# Patient Record
Sex: FEMALE | Employment: OTHER | URBAN - METROPOLITAN AREA
[De-identification: names, ages, dates, MRNs, and addresses within clinical notes are randomized per-mention and may not be internally consistent; named-entity substitution may affect disease eponyms.]

---

## 2018-01-12 ENCOUNTER — LAB REQUISITION (OUTPATIENT)
Dept: LAB | Facility: HOSPITAL | Age: 63
End: 2018-01-12

## 2018-01-12 DIAGNOSIS — Z00.00 ROUTINE GENERAL MEDICAL EXAMINATION AT A HEALTH CARE FACILITY: ICD-10-CM

## 2018-10-10 ENCOUNTER — LAB REQUISITION (OUTPATIENT)
Dept: LAB | Facility: HOSPITAL | Age: 63
End: 2018-10-10

## 2018-10-10 DIAGNOSIS — Z00.00 ROUTINE GENERAL MEDICAL EXAMINATION AT A HEALTH CARE FACILITY: ICD-10-CM

## 2018-10-10 PROCEDURE — 36415 COLL VENOUS BLD VENIPUNCTURE: CPT

## 2019-10-02 ENCOUNTER — LAB REQUISITION (OUTPATIENT)
Dept: LAB | Facility: HOSPITAL | Age: 64
End: 2019-10-02

## 2019-10-02 DIAGNOSIS — Z00.00 ROUTINE GENERAL MEDICAL EXAMINATION AT A HEALTH CARE FACILITY: ICD-10-CM

## 2019-10-02 PROCEDURE — 36415 COLL VENOUS BLD VENIPUNCTURE: CPT

## 2023-04-12 ENCOUNTER — TRANSCRIBE ORDERS (OUTPATIENT)
Dept: PHYSICAL THERAPY | Facility: CLINIC | Age: 68
End: 2023-04-12
Payer: MEDICARE

## 2023-04-12 DIAGNOSIS — M54.50 CHRONIC LOW BACK PAIN WITHOUT SCIATICA, UNSPECIFIED BACK PAIN LATERALITY: Primary | ICD-10-CM

## 2023-04-12 DIAGNOSIS — G89.29 CHRONIC LOW BACK PAIN WITHOUT SCIATICA, UNSPECIFIED BACK PAIN LATERALITY: Primary | ICD-10-CM

## 2023-04-27 ENCOUNTER — TREATMENT (OUTPATIENT)
Dept: PHYSICAL THERAPY | Facility: CLINIC | Age: 68
End: 2023-04-27
Payer: MEDICARE

## 2023-04-27 DIAGNOSIS — G89.29 CHRONIC MIDLINE LOW BACK PAIN WITHOUT SCIATICA: Primary | ICD-10-CM

## 2023-04-27 DIAGNOSIS — M54.50 CHRONIC MIDLINE LOW BACK PAIN WITHOUT SCIATICA: Primary | ICD-10-CM

## 2023-04-27 DIAGNOSIS — R26.89 BALANCE PROBLEM: ICD-10-CM

## 2023-04-27 PROCEDURE — 97110 THERAPEUTIC EXERCISES: CPT | Performed by: PHYSICAL THERAPIST

## 2023-04-27 PROCEDURE — 97162 PT EVAL MOD COMPLEX 30 MIN: CPT | Performed by: PHYSICAL THERAPIST

## 2023-04-27 NOTE — PROGRESS NOTES
Physical Therapy Initial Evaluation and Plan of Care  [address]  [place] [address]    Patient: Vivian Burgess   : 1955  Diagnosis/ICD-10 Code:  No primary diagnosis found.  Referring practitioner: Lisa Whaley MD  Date of Initial Visit: 2023  Today's Date: 2023  Patient seen for Visit count could not be calculated. Make sure you are using a visit which is associated with an episode. session         Visit Diagnoses:  No diagnosis found.      Subjective Questionnaire: Oswestry: 20%      Subjective Evaluation    History of Present Illness  Mechanism of injury: Pt is a 68 year old female presenting to the clinic with low back pain and balance issues. She notices she has trouble with stairs. She feels like she has weakness and her equilibrium is off. She also has low back pain with yard work and walking long periods. Her back used to bother her all the time but now it only bothers her with heavy activities. She used to have some numbness in her extremities, but she has not had that in 6 months. She feels like she has pain after walking for 15 min. Standing for long periods of time makes her feel off balance. She also gets really nervous with walking up bleachers at her [email or web address] games. She takes meloxicam for her back as needed.       Patient Occupation: Retired- part time work at [employer] Quality of life: excellent    Pain  Current pain ratin  At worst pain ratin  Quality: sharp  Relieving factors: medications  Aggravating factors: standing, lifting and ambulation  Progression: worsening    Hand dominance: right    Diagnostic Tests  No diagnostic tests performed    Patient Goals  Patient goals for therapy: decreased pain, increased motion, improved balance, increased strength and independence with ADLs/IADLs             Objective          Active Range of Motion     Lumbar   Flexion: 60 degrees   Extension: 11 degrees   Left lateral flexion: 8 degrees    Right lateral flexion: 10 degrees     Strength/Myotome Testing     Left Hip   Planes of Motion   Flexion: 5  Abduction: 3+    Right Hip   Planes of Motion   Flexion: 5  Abduction: 3+    Left Knee   Flexion: 5  Extension: 5    Right Knee   Flexion: 5  Extension: 5    Left Ankle/Foot   Dorsiflexion: 5    Right Ankle/Foot   Dorsiflexion: 5    Tests     Lumbar     Left   Negative passive SLR.     Right   Negative passive SLR.     Ambulation     Quality of Movement During Gait     Pelvis    Pelvis (Left): Positive Trendelenburg.   Pelvis (Right): Positive Trendelenburg.     Functional Assessment     Single Leg Stance   Left: 2 seconds  Right: 6 seconds    Comments  Tandem balance R in front: 18 sec  Tandem balance L in front: 3 sec    FTSTS: 10 sec          Assessment & Plan     Assessment  Impairments: abnormal gait, abnormal or restricted ROM, activity intolerance, impaired balance, impaired physical strength, lacks appropriate home exercise program and pain with function  Functional Limitations: lifting, walking, uncomfortable because of pain, standing and unable to perform repetitive tasks  Assessment details: Pt is a 68 year old female presenting to the clinic with low back pain and balance issues. She demonstrates decreased lumbar AROM, decreased hip strength, poor balance, and abnormal gait pattern. Her impairments are limiting her ability to go up and down steps and walk for long periods. Pt would benefit from skilled PT services to address her impairments so she can reach her long term goals.  Prognosis: good    Goals  Plan Goals: SHORT TERM GOALS:     2 weeks  1. Pt independent with HEP  2. Pt to demonstrate tandem balance for 30s bilaterally to show improved stability  3. Pt to demonstrate bilateral hip strength 4/5 in all planes to improved stability of the core/trunk     LONG TERM GOALS:   6 weeks  1. Pt to demonstrate trunk AROM % of expected norms to allow for improved ability to perform functional  activities  2. Pt to demonstrate ability to perform full functional squat with good form and without increased pain in the low back   3. Pt to report being able to work full shift or work in the home without increase in pain in the back    Plan  Therapy options: will be seen for skilled therapy services  Planned modality interventions: cryotherapy, dry needling, electrical stimulation/Russian stimulation, high voltage pulsed current (pain management), TENS and thermotherapy (hydrocollator packs)  Planned therapy interventions: abdominal trunk stabilization, manual therapy, neuromuscular re-education, postural training, soft tissue mobilization, spinal/joint mobilization, stretching, strengthening, therapeutic activities, home exercise program, gait training, functional ROM exercises, body mechanics training and balance/weight-bearing training  Duration in visits: 1  Duration in weeks: 12  Treatment plan discussed with: patient        History # of Personal Factors and/or Comorbidities: MODERATE (1-2)  Examination of Body System(s): # of elements: MODERATE (3)  Clinical Presentation: STABLE   Clinical Decision Making: MODERATE      Timed:         Manual Therapy:         mins  82270;     Therapeutic Exercise:    15     mins  00038;     Neuromuscular Romero:        mins  65239;    Therapeutic Activity:          mins  68670;     Gait Training:           mins  92161;     Ultrasound:          mins  38484;    Ionto                                   mins   59740  Self Care                            mins   69794  Canalith Repos         mins 42219      Un-Timed:  Electrical Stimulation:         mins  70238 ( );  Dry Needling          mins self-pay  Traction          mins 99745  Low Eval          Mins  38362  Mod Eval     31     Mins  32844  High Eval                            Mins  00293        Timed Treatment:   15   mins   Total Treatment:     46   mins          PT: Jud Robledo, REJI   License Number:  [id number]  Electronically signed by Jud Robledo, PT, 04/27/23, 9:40 AM EDT    Certification Period: 4/27/2023 thru 7/25/2023  I certify that the therapy services are furnished while this patient is under my care.  The services outlined above are required by this patient, and will be reviewed every 90 days.         Physician Signature:__________________________________________________    PHYSICIAN: Lisa Whaley MD  NPI: [id number]                                      DATE:      Please sign and return via fax to [email or web address] . Thank you, [facility].

## 2023-05-03 ENCOUNTER — TREATMENT (OUTPATIENT)
Dept: PHYSICAL THERAPY | Facility: CLINIC | Age: 68
End: 2023-05-03
Payer: MEDICARE

## 2023-05-03 DIAGNOSIS — R26.89 BALANCE PROBLEM: ICD-10-CM

## 2023-05-03 DIAGNOSIS — M54.50 CHRONIC MIDLINE LOW BACK PAIN WITHOUT SCIATICA: Primary | ICD-10-CM

## 2023-05-03 DIAGNOSIS — G89.29 CHRONIC MIDLINE LOW BACK PAIN WITHOUT SCIATICA: Primary | ICD-10-CM

## 2023-05-03 PROCEDURE — 97112 NEUROMUSCULAR REEDUCATION: CPT | Performed by: PHYSICAL THERAPIST

## 2023-05-03 PROCEDURE — 97110 THERAPEUTIC EXERCISES: CPT | Performed by: PHYSICAL THERAPIST

## 2023-05-03 NOTE — PROGRESS NOTES
Physical Therapy Daily Progress Note    Subjective   Vivian Burgess reports: she is doing well and has been doing her HEP regularly.      Objective   See Exercise, Manual, and Modality Logs for complete treatment.       Assessment/Plan   Pt tolerated treatment well. She required cues to not allow trunk lean with side steps in order to better activate the gluts. Pt would benefit from continued skilled PT.    Progress per Plan of Care           Manual Therapy:         mins  33405;  Therapeutic Exercise:    12     mins  79853;     Neuromuscular Romero:    15    mins  13783;    Therapeutic Activity:          mins  40185;     Gait Training:           mins  54250;     Ultrasound:          mins  97665;    Electrical Stimulation:         mins  46359 ( );  E-Stim Attended:         mins  64207  Iontophoresis          mins 76842   Traction          mins  26114  Fluidotherapy          mins  04848  Dry Needling         mins self-pay - No Charge  Paraffin          mins  70596    Timed Treatment:   27   mins   Total Treatment:     53   mins    Jud Robledo, PT, DPT  Physical Therapist

## 2023-05-11 ENCOUNTER — TREATMENT (OUTPATIENT)
Dept: PHYSICAL THERAPY | Facility: CLINIC | Age: 68
End: 2023-05-11
Payer: MEDICARE

## 2023-05-11 DIAGNOSIS — R26.89 BALANCE PROBLEM: ICD-10-CM

## 2023-05-11 DIAGNOSIS — M54.50 CHRONIC MIDLINE LOW BACK PAIN WITHOUT SCIATICA: Primary | ICD-10-CM

## 2023-05-11 DIAGNOSIS — G89.29 CHRONIC MIDLINE LOW BACK PAIN WITHOUT SCIATICA: Primary | ICD-10-CM

## 2023-05-11 PROCEDURE — 97112 NEUROMUSCULAR REEDUCATION: CPT | Performed by: PHYSICAL THERAPIST

## 2023-05-11 PROCEDURE — 97110 THERAPEUTIC EXERCISES: CPT | Performed by: PHYSICAL THERAPIST

## 2023-05-11 NOTE — PROGRESS NOTES
Physical Therapy Daily Progress Note    Subjective   Vivian Burgess reports: she is doing well. She was a little sore after her last visit.      Objective   See Exercise, Manual, and Modality Logs for complete treatment.       Assessment/Plan   Pt tolerated treatment well. She was able to complete her exercises with min cues for form. She required support for the step up balance exercise. Pt would benefit from continued skilled PT.     Progress per Plan of Care           Manual Therapy:         mins  00177;  Therapeutic Exercise:    15     mins  75502;     Neuromuscular Romero:    10    mins  62805;    Therapeutic Activity:          mins  69976;     Gait Training:           mins  82071;     Ultrasound:          mins  41245;    Electrical Stimulation:         mins  11411 ( );  E-Stim Attended:         mins  22936  Iontophoresis          mins 41118   Traction          mins  97491  Fluidotherapy          mins  20476  Dry Needling          mins self-pay - No Charge  Paraffin          mins  54099    Timed Treatment:   25   mins   Total Treatment:     48   mins    Jud Robledo, PT, DPT  Physical Therapist

## 2023-05-18 ENCOUNTER — TREATMENT (OUTPATIENT)
Dept: PHYSICAL THERAPY | Facility: CLINIC | Age: 68
End: 2023-05-18
Payer: MEDICARE

## 2023-05-18 DIAGNOSIS — R26.89 BALANCE PROBLEM: ICD-10-CM

## 2023-05-18 DIAGNOSIS — G89.29 CHRONIC MIDLINE LOW BACK PAIN WITHOUT SCIATICA: Primary | ICD-10-CM

## 2023-05-18 DIAGNOSIS — M54.50 CHRONIC MIDLINE LOW BACK PAIN WITHOUT SCIATICA: Primary | ICD-10-CM

## 2023-05-18 PROCEDURE — 97112 NEUROMUSCULAR REEDUCATION: CPT | Performed by: PHYSICAL THERAPIST

## 2023-05-18 PROCEDURE — 97110 THERAPEUTIC EXERCISES: CPT | Performed by: PHYSICAL THERAPIST

## 2023-05-18 NOTE — PROGRESS NOTES
Physical Therapy Daily Progress Note    Subjective   Vivian Burgess reports: she is frustrated because she was trying to do her exercises yesterday and she felt really off balance.      Objective          Functional Assessment     Single Leg Stance   Left: 5 seconds  Right: 12 seconds    Comments  Tandem balance bilateral: 30 sec      See Exercise, Manual, and Modality Logs for complete treatment.       Assessment/Plan   Pt tolerated treatment well. Her balance has improved since starting PT. She has the most difficulty with dynamic balance activities. Pt would benefit from continued skilled PT.     Progress per Plan of Care           Manual Therapy:         mins  46511;  Therapeutic Exercise:    15     mins  92576;     Neuromuscular Romero:    10    mins  96270;    Therapeutic Activity:          mins  26259;     Gait Training:           mins  42851;     Ultrasound:          mins  41322;    Electrical Stimulation:         mins  35330 ( );  E-Stim Attended:         mins  90831  Iontophoresis          mins 05184   Traction          mins  04567  Fluidotherapy          mins  05740  Dry Needling          mins self-pay - No Charge  Paraffin          mins  92357    Timed Treatment:   25   mins   Total Treatment:     48   mins    Jud Robledo, PT, DPT  Physical Therapist

## 2023-05-25 ENCOUNTER — TREATMENT (OUTPATIENT)
Dept: PHYSICAL THERAPY | Facility: CLINIC | Age: 68
End: 2023-05-25
Payer: MEDICARE

## 2023-05-25 DIAGNOSIS — R26.89 BALANCE PROBLEM: ICD-10-CM

## 2023-05-25 DIAGNOSIS — G89.29 CHRONIC MIDLINE LOW BACK PAIN WITHOUT SCIATICA: Primary | ICD-10-CM

## 2023-05-25 DIAGNOSIS — M54.50 CHRONIC MIDLINE LOW BACK PAIN WITHOUT SCIATICA: Primary | ICD-10-CM

## 2023-05-25 PROCEDURE — 97110 THERAPEUTIC EXERCISES: CPT | Performed by: PHYSICAL THERAPIST

## 2023-05-25 PROCEDURE — 97112 NEUROMUSCULAR REEDUCATION: CPT | Performed by: PHYSICAL THERAPIST

## 2023-05-25 NOTE — PROGRESS NOTES
Physical Therapy Daily Progress Note    Subjective   Vivian Burgess reports: she feels better but it is not consistent. She still has days where she feels off balance.      Objective   See Exercise, Manual, and Modality Logs for complete treatment.       Assessment/Plan   Pt tolerated treatment well. She required CGA with step up and balance activity today. She is progressing well and would benefit from continued skilled PT.     Progress per Plan of Care           Manual Therapy:         mins  77211;  Therapeutic Exercise:    15     mins  84301;     Neuromuscular Romero:    10    mins  58141;    Therapeutic Activity:          mins  71969;     Gait Training:           mins  74690;     Ultrasound:          mins  13337;    Electrical Stimulation:         mins  64979 ( );  E-Stim Attended:         mins  38874  Iontophoresis          mins 43969   Traction          mins  95209  Fluidotherapy          mins  53753  Dry Needling          mins self-pay - No Charge  Paraffin          mins  07987    Timed Treatment:  25    mins   Total Treatment:     52   mins    Jud Robledo, PT, DPT  Physical Therapist

## 2023-10-11 ENCOUNTER — TRANSCRIBE ORDERS (OUTPATIENT)
Dept: ADMINISTRATIVE | Facility: HOSPITAL | Age: 68
End: 2023-10-11
Payer: MEDICARE

## 2023-10-11 DIAGNOSIS — R22.41 LUMP OF SKIN OF RIGHT LOWER EXTREMITY: Primary | ICD-10-CM
